# Patient Record
Sex: MALE
[De-identification: names, ages, dates, MRNs, and addresses within clinical notes are randomized per-mention and may not be internally consistent; named-entity substitution may affect disease eponyms.]

---

## 2017-04-04 ENCOUNTER — CHARTING TRANS (OUTPATIENT)
Dept: OTHER | Age: 47
End: 2017-04-04

## 2018-11-05 VITALS
TEMPERATURE: 98.3 F | WEIGHT: 215 LBS | RESPIRATION RATE: 16 BRPM | HEIGHT: 72 IN | HEART RATE: 88 BPM | SYSTOLIC BLOOD PRESSURE: 128 MMHG | DIASTOLIC BLOOD PRESSURE: 80 MMHG | BODY MASS INDEX: 29.12 KG/M2

## 2024-12-16 ENCOUNTER — HOSPITAL ENCOUNTER (EMERGENCY)
Facility: HOSPITAL | Age: 54
Discharge: HOME | End: 2024-12-16
Attending: EMERGENCY MEDICINE
Payer: COMMERCIAL

## 2024-12-16 ENCOUNTER — APPOINTMENT (OUTPATIENT)
Dept: GENERAL RADIOLOGY | Facility: HOSPITAL | Age: 54
End: 2024-12-16
Attending: EMERGENCY MEDICINE
Payer: COMMERCIAL

## 2024-12-16 VITALS
HEART RATE: 77 BPM | TEMPERATURE: 97 F | RESPIRATION RATE: 16 BRPM | DIASTOLIC BLOOD PRESSURE: 80 MMHG | SYSTOLIC BLOOD PRESSURE: 127 MMHG | WEIGHT: 243 LBS | OXYGEN SATURATION: 95 %

## 2024-12-16 DIAGNOSIS — J40 BRONCHITIS: Primary | ICD-10-CM

## 2024-12-16 PROCEDURE — 71046 X-RAY EXAM CHEST 2 VIEWS: CPT | Performed by: EMERGENCY MEDICINE

## 2024-12-16 PROCEDURE — 99283 EMERGENCY DEPT VISIT LOW MDM: CPT

## 2024-12-16 PROCEDURE — 99284 EMERGENCY DEPT VISIT MOD MDM: CPT

## 2024-12-16 RX ORDER — AMLODIPINE BESYLATE 5 MG/1
5 TABLET ORAL DAILY
COMMUNITY

## 2024-12-16 RX ORDER — ALBUTEROL SULFATE 90 UG/1
2 INHALANT RESPIRATORY (INHALATION) EVERY 4 HOURS PRN
Qty: 8 G | Refills: 0 | Status: SHIPPED | OUTPATIENT
Start: 2024-12-16 | End: 2025-01-15

## 2024-12-16 RX ORDER — PREDNISONE 20 MG/1
40 TABLET ORAL DAILY
Qty: 10 TABLET | Refills: 0 | Status: SHIPPED | OUTPATIENT
Start: 2024-12-16 | End: 2024-12-21

## 2024-12-16 NOTE — ED INITIAL ASSESSMENT (HPI)
Pt arrives ambulatory to ED for c/o coughing up blood that started approx. 60 min PTA, pt states it was only 1 episode of hemoptysis. Denies thinners. Aox4, speaking in full sentences.

## 2024-12-16 NOTE — ED PROVIDER NOTES
Patient Seen in: [facility] Emergency Department      History     Chief Complaint   Patient presents with    Hemoptysis     Stated Complaint: hemoptysis x1HR PTA, HA    Subjective:   HPI      Is a 54-year-old male who presents with cough productive of white sputum mixed with blood that occurred once tonight.  He states he has had a cough and congestion recently and sick contacts at home with similar symptoms.  No fevers or shortness of breath.  No other easy bruising or bleeding.  No anticoagulation.  No recent travel.  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    Objective:     Past Medical History:    Essential hypertension              History reviewed. No pertinent surgical history.             Social History     Socioeconomic History    Marital status:    Tobacco Use    Smoking status: Never    Smokeless tobacco: Never     Social Drivers of Health     Financial Resource Strain: Low Risk  (7/2/2024)    Received from [facility]    Overall Financial Resource Strain (CARDIA)     Difficulty of Paying Living Expenses: Not hard at all   Food Insecurity: No Food Insecurity (7/2/2024)    Received from [facility]    Hunger Vital Sign     Worried About Running Out of Food in the Last Year: Never true     Ran Out of Food in the Last Year: Never true   Transportation Needs: No Transportation Needs (7/2/2024)    Received from [facility]    PRAPARE - Transportation     Lack of Transportation (Medical): No     Lack of Transportation (Non-Medical): No   Housing Stability: Unknown (12/5/2024)    Received from [facility]    Housing Stability Vital Sign     Unable to Pay for Housing in the Last Year: Patient declined                  Physical Exam     ED Triage Vitals [12/16/24 0036]   /88   Pulse 88   Resp 16   Temp 97.1 °F (36.2 °C)   Temp src Temporal   SpO2 99 %   O2 Device None (Room air)       Current Vitals:   Vital  Signs  BP: 127/80  Pulse: 77  Resp: 16  Temp: 97.1 °F (36.2 °C)  Temp src: Temporal  MAP (mmHg): 96    Oxygen Therapy  SpO2: 95 %  O2 Device: None (Room air)        Physical Exam  GENERAL: No acute distress, awake and alert  HEENT: EOMI, PERRL  Neck: supple  CV: RRR, no murmurs  Resp: CTAB, no wheezes or retractions  Extremities: FROM of all extremities  Neuro: CN intact, normal speech, normal gait, 5/5 motor strength in all extremities, no focal deficits  SKIN: warm, dry, no rashes      ED Course   Labs Reviewed - No data to display       MDM        Medical Decision Making  Ddx: bronchitis vs pneumonia vs viral syndrome    Patient in no respiratory distress.  Vital stable.  No TB risk factors.  He also states he has sinus congestion and had a mild headache earlier but is now resolved.  Declines any further workup and appears well and nontoxic and appropriate for discharge.  Advised on return precautions.  Family at bedside.    Amount and/or Complexity of Data Reviewed  Radiology: ordered and independent interpretation performed.     Details: Cxr reviewed by me as no  obvious infiltrate    CHEST RADIOGRAPH(S)    COMPARISON: None.      IMPRESSION:  No focal consolidation.  No pneumothorax or pleural effusion.  Normal cardiomediastinal silhouette.  No acute osseous abnormality.      Report finalized on 12/16/24 at 3:08 AM ET.      Dr. Yani Welch MD      Risk  Prescription drug management.        Disposition and Plan     Clinical Impression:  1. Bronchitis         Disposition:  Discharge  12/16/2024  2:38 am    Follow-up:  Ankit Felder  [address] [place] [id number] [phone]    Follow up            Medications Prescribed:  Current Discharge Medication List        START taking these medications    Details   albuterol 108 (90 Base) MCG/ACT Inhalation Aero Soln Inhale 2 puffs into the lungs every 4 (four) hours as needed.  Qty: 8 g, Refills: 0      predniSONE 20 MG Oral Tab Take 2  tablets (40 mg total) by mouth daily for 5 days.  Qty: 10 tablet, Refills: 0                 Supplementary Documentation:

## (undated) NOTE — LETTER
Date & Time: 12/17/2024, 1:55 PM  Patient: Christopher Osborne  Encounter Provider(s):    Evon Johnson MD       To Whom It May Concern:    Christopher Osborne was seen and treated in our department on 12/16/2024. He may return to work on 12/17/2024.    If you have any questions or concerns, please do not hesitate to call.        _____________________________  Physician/APC Signature

## (undated) NOTE — LETTER
Date & Time: 12/16/2024, 2:41 AM  Patient: Christopher Osborne  Encounter Provider(s):    Evon Johnson MD       To Whom It May Concern:    Christopher Osborne was seen and treated in our department on 12/16/2024. He can return to work on 12/16/2024.    If you have any questions or concerns, please do not hesitate to call.        _____________________________  Physician/APC Signature